# Patient Record
Sex: FEMALE | Race: WHITE
[De-identification: names, ages, dates, MRNs, and addresses within clinical notes are randomized per-mention and may not be internally consistent; named-entity substitution may affect disease eponyms.]

---

## 2018-05-10 ENCOUNTER — HOSPITAL ENCOUNTER (OUTPATIENT)
Dept: HOSPITAL 95 - LAB SHORT | Age: 83
Discharge: HOME | End: 2018-05-10
Attending: INTERNAL MEDICINE
Payer: MEDICARE

## 2018-05-10 DIAGNOSIS — N39.0: Primary | ICD-10-CM

## 2018-05-10 LAB
LEUKOCYTE ESTERASE UR QL STRIP: (no result)
RBC #/AREA URNS HPF: (no result) /HPF (ref 0–2)
SP GR SPEC: 1.01 (ref 1–1.02)
UROBILINOGEN UR STRIP-MCNC: (no result) MG/DL
WBC #/AREA URNS HPF: (no result) /HPF (ref 0–5)

## 2018-06-05 ENCOUNTER — HOSPITAL ENCOUNTER (EMERGENCY)
Dept: HOSPITAL 95 - ER | Age: 83
Discharge: HOME | End: 2018-06-05
Payer: MEDICARE

## 2018-06-05 VITALS — HEIGHT: 64 IN | BODY MASS INDEX: 34.15 KG/M2 | WEIGHT: 200 LBS

## 2018-06-05 DIAGNOSIS — Z88.8: ICD-10-CM

## 2018-06-05 DIAGNOSIS — Z88.5: ICD-10-CM

## 2018-06-05 DIAGNOSIS — Z79.4: ICD-10-CM

## 2018-06-05 DIAGNOSIS — Z79.899: ICD-10-CM

## 2018-06-05 DIAGNOSIS — S99.912A: Primary | ICD-10-CM

## 2018-06-05 DIAGNOSIS — I50.9: ICD-10-CM

## 2018-06-05 DIAGNOSIS — Z88.2: ICD-10-CM
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
## 2018-06-05 DIAGNOSIS — Z79.01: ICD-10-CM

## 2018-06-05 DIAGNOSIS — E03.9: ICD-10-CM

## 2018-06-05 DIAGNOSIS — Z88.6: ICD-10-CM

## 2018-06-05 DIAGNOSIS — I48.91: ICD-10-CM

## 2018-06-05 DIAGNOSIS — I11.0: ICD-10-CM

## 2018-07-02 ENCOUNTER — HOSPITAL ENCOUNTER (INPATIENT)
Dept: HOSPITAL 95 - ER | Age: 83
LOS: 4 days | Discharge: HOME | DRG: 918 | End: 2018-07-06
Attending: FAMILY MEDICINE | Admitting: FAMILY MEDICINE
Payer: MEDICARE

## 2018-07-02 VITALS — BODY MASS INDEX: 32.93 KG/M2 | HEIGHT: 64.02 IN | WEIGHT: 192.9 LBS

## 2018-07-02 DIAGNOSIS — M10.9: ICD-10-CM

## 2018-07-02 DIAGNOSIS — I48.0: ICD-10-CM

## 2018-07-02 DIAGNOSIS — I25.10: ICD-10-CM

## 2018-07-02 DIAGNOSIS — N18.3: ICD-10-CM

## 2018-07-02 DIAGNOSIS — E87.70: ICD-10-CM

## 2018-07-02 DIAGNOSIS — I50.9: ICD-10-CM

## 2018-07-02 DIAGNOSIS — Z95.0: ICD-10-CM

## 2018-07-02 DIAGNOSIS — E86.1: ICD-10-CM

## 2018-07-02 DIAGNOSIS — T42.6X1A: Primary | ICD-10-CM

## 2018-07-02 DIAGNOSIS — I25.2: ICD-10-CM

## 2018-07-02 DIAGNOSIS — E05.90: ICD-10-CM

## 2018-07-02 DIAGNOSIS — E11.22: ICD-10-CM

## 2018-07-02 DIAGNOSIS — E88.09: ICD-10-CM

## 2018-07-02 DIAGNOSIS — I13.0: ICD-10-CM

## 2018-07-02 DIAGNOSIS — N17.9: ICD-10-CM

## 2018-07-02 DIAGNOSIS — Z79.4: ICD-10-CM

## 2018-07-02 DIAGNOSIS — E03.9: ICD-10-CM

## 2018-07-02 DIAGNOSIS — E66.9: ICD-10-CM

## 2018-07-02 DIAGNOSIS — Z79.01: ICD-10-CM

## 2018-07-02 LAB
ALBUMIN SERPL BCP-MCNC: 3.2 G/DL (ref 3.4–5)
ALBUMIN/GLOB SERPL: 0.7 {RATIO} (ref 0.8–1.8)
ALT SERPL W P-5'-P-CCNC: 25 U/L (ref 12–78)
ANION GAP SERPL CALCULATED.4IONS-SCNC: 12 MMOL/L (ref 6–16)
AST SERPL W P-5'-P-CCNC: 18 U/L (ref 12–37)
BASOPHILS # BLD AUTO: 0.04 K/MM3 (ref 0–0.23)
BASOPHILS NFR BLD AUTO: 0 % (ref 0–2)
BILIRUB SERPL-MCNC: 0.3 MG/DL (ref 0.1–1)
BUN SERPL-MCNC: 82 MG/DL (ref 8–24)
CALCIUM SERPL-MCNC: 9 MG/DL (ref 8.5–10.1)
CHLORIDE SERPL-SCNC: 99 MMOL/L (ref 98–108)
CO2 SERPL-SCNC: 30 MMOL/L (ref 21–32)
CREAT SERPL-MCNC: 1.41 MG/DL (ref 0.4–1)
DEPRECATED RDW RBC AUTO: 41.9 FL (ref 35.1–46.3)
EOSINOPHIL # BLD AUTO: 0.13 K/MM3 (ref 0–0.68)
EOSINOPHIL NFR BLD AUTO: 1 % (ref 0–6)
ERYTHROCYTE [DISTWIDTH] IN BLOOD BY AUTOMATED COUNT: 12.3 % (ref 11.7–14.2)
GLOBULIN SER CALC-MCNC: 4.4 G/DL (ref 2.2–4)
GLUCOSE SERPL-MCNC: 219 MG/DL (ref 70–99)
HCT VFR BLD AUTO: 38.7 % (ref 33–51)
HGB BLD-MCNC: 13.1 G/DL (ref 11.5–16)
IMM GRANULOCYTES # BLD AUTO: 0.03 K/MM3 (ref 0–0.1)
IMM GRANULOCYTES NFR BLD AUTO: 0 % (ref 0–1)
LYMPHOCYTES # BLD AUTO: 0.99 K/MM3 (ref 0.84–5.2)
LYMPHOCYTES NFR BLD AUTO: 8 % (ref 21–46)
MAGNESIUM SERPL-MCNC: 2.5 MG/DL (ref 1.6–2.4)
MCHC RBC AUTO-ENTMCNC: 33.9 G/DL (ref 31.5–36.5)
MCV RBC AUTO: 93 FL (ref 80–100)
MONOCYTES # BLD AUTO: 1.05 K/MM3 (ref 0.16–1.47)
MONOCYTES NFR BLD AUTO: 9 % (ref 4–13)
NEUTROPHILS # BLD AUTO: 9.5 K/MM3 (ref 1.96–9.15)
NEUTROPHILS NFR BLD AUTO: 81 % (ref 41–73)
NRBC # BLD AUTO: 0 K/MM3 (ref 0–0.02)
NRBC BLD AUTO-RTO: 0 /100 WBC (ref 0–0.2)
PLATELET # BLD AUTO: 283 K/MM3 (ref 150–400)
POTASSIUM SERPL-SCNC: 3.8 MMOL/L (ref 3.5–5.5)
PROT SERPL-MCNC: 7.6 G/DL (ref 6.4–8.2)
PROTHROMBIN TIME: 108.5 SEC (ref 9.7–11.5)
SODIUM SERPL-SCNC: 141 MMOL/L (ref 136–145)
TROPONIN I SERPL-MCNC: <0.015 NG/ML (ref 0–0.04)
TSH SERPL DL<=0.005 MIU/L-ACNC: 0.25 UIU/ML (ref 0.36–4.8)
URATE SERPL-MCNC: 13.4 MG/DL (ref 2.6–6)

## 2018-07-02 PROCEDURE — G0378 HOSPITAL OBSERVATION PER HR: HCPCS

## 2018-07-03 LAB
ALBUMIN SERPL BCP-MCNC: 2.6 G/DL (ref 3.4–5)
ANION GAP SERPL CALCULATED.4IONS-SCNC: 8 MMOL/L (ref 6–16)
BASOPHILS # BLD AUTO: 0.03 K/MM3 (ref 0–0.23)
BASOPHILS NFR BLD AUTO: 0 % (ref 0–2)
BUN SERPL-MCNC: 74 MG/DL (ref 8–24)
CALCIUM SERPL-MCNC: 8.3 MG/DL (ref 8.5–10.1)
CHLORIDE SERPL-SCNC: 103 MMOL/L (ref 98–108)
CO2 SERPL-SCNC: 28 MMOL/L (ref 21–32)
CREAT SERPL-MCNC: 1.52 MG/DL (ref 0.4–1)
DEPRECATED RDW RBC AUTO: 42.6 FL (ref 35.1–46.3)
EOSINOPHIL # BLD AUTO: 0.18 K/MM3 (ref 0–0.68)
EOSINOPHIL NFR BLD AUTO: 2 % (ref 0–6)
ERYTHROCYTE [DISTWIDTH] IN BLOOD BY AUTOMATED COUNT: 12.4 % (ref 11.7–14.2)
GLUCOSE SERPL-MCNC: 225 MG/DL (ref 70–99)
HCT VFR BLD AUTO: 35.2 % (ref 33–51)
HGB BLD-MCNC: 11.8 G/DL (ref 11.5–16)
IMM GRANULOCYTES # BLD AUTO: 0.02 K/MM3 (ref 0–0.1)
IMM GRANULOCYTES NFR BLD AUTO: 0 % (ref 0–1)
LYMPHOCYTES # BLD AUTO: 1.45 K/MM3 (ref 0.84–5.2)
LYMPHOCYTES NFR BLD AUTO: 17 % (ref 21–46)
MAGNESIUM SERPL-MCNC: 2.5 MG/DL (ref 1.6–2.4)
MCHC RBC AUTO-ENTMCNC: 33.5 G/DL (ref 31.5–36.5)
MCV RBC AUTO: 93 FL (ref 80–100)
MONOCYTES # BLD AUTO: 1.16 K/MM3 (ref 0.16–1.47)
MONOCYTES NFR BLD AUTO: 13 % (ref 4–13)
NEUTROPHILS # BLD AUTO: 5.83 K/MM3 (ref 1.96–9.15)
NEUTROPHILS NFR BLD AUTO: 67 % (ref 41–73)
NRBC # BLD AUTO: 0 K/MM3 (ref 0–0.02)
NRBC BLD AUTO-RTO: 0 /100 WBC (ref 0–0.2)
PHOSPHATE SERPL-MCNC: 3.3 MG/DL (ref 2.5–4.9)
PLATELET # BLD AUTO: 264 K/MM3 (ref 150–400)
POTASSIUM SERPL-SCNC: 4.1 MMOL/L (ref 3.5–5.5)
PROTHROMBIN TIME: 90.9 SEC (ref 9.7–11.5)
SODIUM SERPL-SCNC: 139 MMOL/L (ref 136–145)

## 2018-07-04 LAB
ALBUMIN SERPL BCP-MCNC: 2.8 G/DL (ref 3.4–5)
ANION GAP SERPL CALCULATED.4IONS-SCNC: 12 MMOL/L (ref 6–16)
BASOPHILS # BLD AUTO: 0.01 K/MM3 (ref 0–0.23)
BASOPHILS NFR BLD AUTO: 0 % (ref 0–2)
BUN SERPL-MCNC: 73 MG/DL (ref 8–24)
CALCIUM SERPL-MCNC: 8.9 MG/DL (ref 8.5–10.1)
CHLORIDE SERPL-SCNC: 100 MMOL/L (ref 98–108)
CO2 SERPL-SCNC: 26 MMOL/L (ref 21–32)
CREAT SERPL-MCNC: 1.45 MG/DL (ref 0.4–1)
DEPRECATED RDW RBC AUTO: 40.9 FL (ref 35.1–46.3)
EOSINOPHIL # BLD AUTO: 0 K/MM3 (ref 0–0.68)
EOSINOPHIL NFR BLD AUTO: 0 % (ref 0–6)
ERYTHROCYTE [DISTWIDTH] IN BLOOD BY AUTOMATED COUNT: 12.1 % (ref 11.7–14.2)
GLUCOSE SERPL-MCNC: 311 MG/DL (ref 70–99)
HCT VFR BLD AUTO: 34.8 % (ref 33–51)
HGB BLD-MCNC: 11.9 G/DL (ref 11.5–16)
IMM GRANULOCYTES # BLD AUTO: 0.06 K/MM3 (ref 0–0.1)
IMM GRANULOCYTES NFR BLD AUTO: 1 % (ref 0–1)
LYMPHOCYTES # BLD AUTO: 0.72 K/MM3 (ref 0.84–5.2)
LYMPHOCYTES NFR BLD AUTO: 6 % (ref 21–46)
MAGNESIUM SERPL-MCNC: 2.4 MG/DL (ref 1.6–2.4)
MCHC RBC AUTO-ENTMCNC: 34.2 G/DL (ref 31.5–36.5)
MCV RBC AUTO: 92 FL (ref 80–100)
MONOCYTES # BLD AUTO: 0.96 K/MM3 (ref 0.16–1.47)
MONOCYTES NFR BLD AUTO: 9 % (ref 4–13)
NEUTROPHILS # BLD AUTO: 9.57 K/MM3 (ref 1.96–9.15)
NEUTROPHILS NFR BLD AUTO: 85 % (ref 41–73)
NRBC # BLD AUTO: 0 K/MM3 (ref 0–0.02)
NRBC BLD AUTO-RTO: 0 /100 WBC (ref 0–0.2)
PHOSPHATE SERPL-MCNC: 2.4 MG/DL (ref 2.5–4.9)
PLATELET # BLD AUTO: 280 K/MM3 (ref 150–400)
POTASSIUM SERPL-SCNC: 4 MMOL/L (ref 3.5–5.5)
PROTHROMBIN TIME: 55.1 SEC (ref 9.7–11.5)
SODIUM SERPL-SCNC: 138 MMOL/L (ref 136–145)

## 2018-07-05 LAB
ALBUMIN SERPL BCP-MCNC: 2.5 G/DL (ref 3.4–5)
ANION GAP SERPL CALCULATED.4IONS-SCNC: 9 MMOL/L (ref 6–16)
BUN SERPL-MCNC: 78 MG/DL (ref 8–24)
CALCIUM SERPL-MCNC: 8.9 MG/DL (ref 8.5–10.1)
CHLORIDE SERPL-SCNC: 102 MMOL/L (ref 98–108)
CO2 SERPL-SCNC: 28 MMOL/L (ref 21–32)
CREAT SERPL-MCNC: 1.56 MG/DL (ref 0.4–1)
GLUCOSE SERPL-MCNC: 227 MG/DL (ref 70–99)
HCT VFR BLD AUTO: 32.9 % (ref 33–51)
HGB BLD-MCNC: 11.2 G/DL (ref 11.5–16)
MAGNESIUM SERPL-MCNC: 2.2 MG/DL (ref 1.6–2.4)
PHOSPHATE SERPL-MCNC: 2.6 MG/DL (ref 2.5–4.9)
POTASSIUM SERPL-SCNC: 4.1 MMOL/L (ref 3.5–5.5)
PROTHROMBIN TIME: 46.2 SEC (ref 9.7–11.5)
SODIUM SERPL-SCNC: 139 MMOL/L (ref 136–145)

## 2018-07-06 LAB
ALBUMIN SERPL BCP-MCNC: 2.7 G/DL (ref 3.4–5)
ANION GAP SERPL CALCULATED.4IONS-SCNC: 11 MMOL/L (ref 6–16)
BUN SERPL-MCNC: 81 MG/DL (ref 8–24)
CALCIUM SERPL-MCNC: 8.5 MG/DL (ref 8.5–10.1)
CHLORIDE SERPL-SCNC: 102 MMOL/L (ref 98–108)
CO2 SERPL-SCNC: 27 MMOL/L (ref 21–32)
CREAT SERPL-MCNC: 1.28 MG/DL (ref 0.4–1)
GLUCOSE SERPL-MCNC: 146 MG/DL (ref 70–99)
HCT VFR BLD AUTO: 32 % (ref 33–51)
HGB BLD-MCNC: 10.7 G/DL (ref 11.5–16)
MAGNESIUM SERPL-MCNC: 2.1 MG/DL (ref 1.6–2.4)
PHOSPHATE SERPL-MCNC: 2.4 MG/DL (ref 2.5–4.9)
POTASSIUM SERPL-SCNC: 4.2 MMOL/L (ref 3.5–5.5)
PROTHROMBIN TIME: 14.7 SEC (ref 9.7–11.5)
SODIUM SERPL-SCNC: 140 MMOL/L (ref 136–145)
URATE SERPL-MCNC: 8.4 MG/DL (ref 2.6–6)

## 2018-08-06 ENCOUNTER — HOSPITAL ENCOUNTER (EMERGENCY)
Dept: HOSPITAL 95 - ER | Age: 83
Discharge: HOME | End: 2018-08-06
Payer: MEDICARE

## 2018-08-06 VITALS — HEIGHT: 64 IN | BODY MASS INDEX: 33.97 KG/M2 | WEIGHT: 198.99 LBS

## 2018-08-06 DIAGNOSIS — R79.1: Primary | ICD-10-CM

## 2018-08-06 DIAGNOSIS — Z79.4: ICD-10-CM

## 2018-08-06 DIAGNOSIS — I48.0: ICD-10-CM

## 2018-08-06 DIAGNOSIS — E03.9: ICD-10-CM

## 2018-08-06 DIAGNOSIS — Z79.899: ICD-10-CM

## 2018-08-06 DIAGNOSIS — Z79.01: ICD-10-CM

## 2018-08-06 DIAGNOSIS — I50.9: ICD-10-CM

## 2018-08-06 DIAGNOSIS — I11.0: ICD-10-CM

## 2018-08-06 DIAGNOSIS — I48.2: ICD-10-CM

## 2018-08-06 DIAGNOSIS — E11.9: ICD-10-CM

## 2018-08-06 LAB
ALBUMIN SERPL BCP-MCNC: 3.3 G/DL (ref 3.4–5)
ALBUMIN/GLOB SERPL: 0.9 {RATIO} (ref 0.8–1.8)
ALT SERPL W P-5'-P-CCNC: 110 U/L (ref 12–78)
ANION GAP SERPL CALCULATED.4IONS-SCNC: 9 MMOL/L (ref 6–16)
AST SERPL W P-5'-P-CCNC: 79 U/L (ref 12–37)
BASOPHILS # BLD AUTO: 0.04 K/MM3 (ref 0–0.23)
BASOPHILS NFR BLD AUTO: 1 % (ref 0–2)
BILIRUB SERPL-MCNC: 0.4 MG/DL (ref 0.1–1)
BUN SERPL-MCNC: 47 MG/DL (ref 8–24)
CALCIUM SERPL-MCNC: 9 MG/DL (ref 8.5–10.1)
CHLORIDE SERPL-SCNC: 104 MMOL/L (ref 98–108)
CO2 SERPL-SCNC: 26 MMOL/L (ref 21–32)
CREAT SERPL-MCNC: 1.32 MG/DL (ref 0.4–1)
DEPRECATED RDW RBC AUTO: 42.6 FL (ref 35.1–46.3)
EOSINOPHIL # BLD AUTO: 0.18 K/MM3 (ref 0–0.68)
EOSINOPHIL NFR BLD AUTO: 3 % (ref 0–6)
ERYTHROCYTE [DISTWIDTH] IN BLOOD BY AUTOMATED COUNT: 12.6 % (ref 11.7–14.2)
GLOBULIN SER CALC-MCNC: 3.5 G/DL (ref 2.2–4)
GLUCOSE SERPL-MCNC: 204 MG/DL (ref 70–99)
HCT VFR BLD AUTO: 37 % (ref 33–51)
HGB BLD-MCNC: 12.6 G/DL (ref 11.5–16)
IMM GRANULOCYTES # BLD AUTO: 0.02 K/MM3 (ref 0–0.1)
IMM GRANULOCYTES NFR BLD AUTO: 0 % (ref 0–1)
LYMPHOCYTES # BLD AUTO: 1.43 K/MM3 (ref 0.84–5.2)
LYMPHOCYTES NFR BLD AUTO: 26 % (ref 21–46)
MCHC RBC AUTO-ENTMCNC: 34.1 G/DL (ref 31.5–36.5)
MCV RBC AUTO: 92 FL (ref 80–100)
MONOCYTES # BLD AUTO: 0.71 K/MM3 (ref 0.16–1.47)
MONOCYTES NFR BLD AUTO: 13 % (ref 4–13)
NEUTROPHILS # BLD AUTO: 3.05 K/MM3 (ref 1.96–9.15)
NEUTROPHILS NFR BLD AUTO: 56 % (ref 41–73)
NRBC # BLD AUTO: 0 K/MM3 (ref 0–0.02)
NRBC BLD AUTO-RTO: 0 /100 WBC (ref 0–0.2)
PLATELET # BLD AUTO: 296 K/MM3 (ref 150–400)
POTASSIUM SERPL-SCNC: 3.8 MMOL/L (ref 3.5–5.5)
PROT SERPL-MCNC: 6.8 G/DL (ref 6.4–8.2)
PROTHROMBIN TIME: 66.2 SEC (ref 9.7–11.5)
SODIUM SERPL-SCNC: 139 MMOL/L (ref 136–145)

## 2018-08-15 ENCOUNTER — HOSPITAL ENCOUNTER (EMERGENCY)
Dept: HOSPITAL 95 - ER | Age: 83
Discharge: HOME | End: 2018-08-15
Payer: MEDICARE

## 2018-08-15 VITALS — BODY MASS INDEX: 38.98 KG/M2 | WEIGHT: 220 LBS | HEIGHT: 63 IN

## 2018-08-15 DIAGNOSIS — Z79.899: ICD-10-CM

## 2018-08-15 DIAGNOSIS — Z91.09: ICD-10-CM

## 2018-08-15 DIAGNOSIS — Z79.01: ICD-10-CM

## 2018-08-15 DIAGNOSIS — Z88.2: ICD-10-CM

## 2018-08-15 DIAGNOSIS — Z88.8: ICD-10-CM

## 2018-08-15 DIAGNOSIS — Z88.5: ICD-10-CM

## 2018-08-15 DIAGNOSIS — I50.9: ICD-10-CM

## 2018-08-15 DIAGNOSIS — I11.0: ICD-10-CM

## 2018-08-15 DIAGNOSIS — Z79.4: ICD-10-CM

## 2018-08-15 DIAGNOSIS — M10.9: Primary | ICD-10-CM

## 2018-08-15 DIAGNOSIS — I48.91: ICD-10-CM

## 2018-12-20 ENCOUNTER — HOSPITAL ENCOUNTER (OUTPATIENT)
Dept: HOSPITAL 95 - LAB SHORT | Age: 83
Discharge: HOME | End: 2018-12-20
Attending: NURSE PRACTITIONER
Payer: MEDICARE

## 2018-12-20 DIAGNOSIS — N39.0: Primary | ICD-10-CM

## 2018-12-26 ENCOUNTER — HOSPITAL ENCOUNTER (INPATIENT)
Dept: HOSPITAL 95 - ER | Age: 83
LOS: 8 days | Discharge: HOSPICE | DRG: 55 | End: 2019-01-03
Attending: HOSPITALIST | Admitting: HOSPITALIST
Payer: MEDICARE

## 2018-12-26 VITALS — BODY MASS INDEX: 33.98 KG/M2 | WEIGHT: 191.8 LBS | HEIGHT: 62.99 IN

## 2018-12-26 DIAGNOSIS — G30.9: ICD-10-CM

## 2018-12-26 DIAGNOSIS — Z95.0: ICD-10-CM

## 2018-12-26 DIAGNOSIS — Z51.5: ICD-10-CM

## 2018-12-26 DIAGNOSIS — F22: ICD-10-CM

## 2018-12-26 DIAGNOSIS — I48.0: ICD-10-CM

## 2018-12-26 DIAGNOSIS — E11.9: ICD-10-CM

## 2018-12-26 DIAGNOSIS — C71.2: Primary | ICD-10-CM

## 2018-12-26 DIAGNOSIS — E87.6: ICD-10-CM

## 2018-12-26 DIAGNOSIS — E03.9: ICD-10-CM

## 2018-12-26 DIAGNOSIS — F02.81: ICD-10-CM

## 2018-12-26 DIAGNOSIS — I25.10: ICD-10-CM

## 2018-12-26 DIAGNOSIS — I25.2: ICD-10-CM

## 2018-12-26 LAB
ALBUMIN SERPL BCP-MCNC: 3.3 G/DL (ref 3.4–5)
ALBUMIN/GLOB SERPL: 0.7 {RATIO} (ref 0.8–1.8)
ALT SERPL W P-5'-P-CCNC: 30 U/L (ref 12–78)
ANION GAP SERPL CALCULATED.4IONS-SCNC: 8 MMOL/L (ref 6–16)
AST SERPL W P-5'-P-CCNC: 29 U/L (ref 12–37)
BASOPHILS # BLD AUTO: 0.04 K/MM3 (ref 0–0.23)
BASOPHILS NFR BLD AUTO: 0 % (ref 0–2)
BILIRUB SERPL-MCNC: 0.4 MG/DL (ref 0.1–1)
BUN SERPL-MCNC: 18 MG/DL (ref 8–24)
CALCIUM SERPL-MCNC: 9 MG/DL (ref 8.5–10.1)
CHLORIDE SERPL-SCNC: 103 MMOL/L (ref 98–108)
CO2 SERPL-SCNC: 32 MMOL/L (ref 21–32)
CREAT SERPL-MCNC: 0.93 MG/DL (ref 0.4–1)
DEPRECATED RDW RBC AUTO: 49 FL (ref 35.1–46.3)
EOSINOPHIL # BLD AUTO: 0.13 K/MM3 (ref 0–0.68)
EOSINOPHIL NFR BLD AUTO: 1 % (ref 0–6)
ERYTHROCYTE [DISTWIDTH] IN BLOOD BY AUTOMATED COUNT: 15 % (ref 11.7–14.2)
GLOBULIN SER CALC-MCNC: 4.6 G/DL (ref 2.2–4)
GLUCOSE SERPL-MCNC: 117 MG/DL (ref 70–99)
HCT VFR BLD AUTO: 44.9 % (ref 33–51)
HGB BLD-MCNC: 14.7 G/DL (ref 11.5–16)
IMM GRANULOCYTES # BLD AUTO: 0.04 K/MM3 (ref 0–0.1)
IMM GRANULOCYTES NFR BLD AUTO: 0 % (ref 0–1)
LEUKOCYTE ESTERASE UR QL STRIP: (no result)
LYMPHOCYTES # BLD AUTO: 1.29 K/MM3 (ref 0.84–5.2)
LYMPHOCYTES NFR BLD AUTO: 13 % (ref 21–46)
MCHC RBC AUTO-ENTMCNC: 32.7 G/DL (ref 31.5–36.5)
MCV RBC AUTO: 90 FL (ref 80–100)
MONOCYTES # BLD AUTO: 0.81 K/MM3 (ref 0.16–1.47)
MONOCYTES NFR BLD AUTO: 8 % (ref 4–13)
NEUTROPHILS # BLD AUTO: 7.68 K/MM3 (ref 1.96–9.15)
NEUTROPHILS NFR BLD AUTO: 77 % (ref 41–73)
NRBC # BLD AUTO: 0 K/MM3 (ref 0–0.02)
NRBC BLD AUTO-RTO: 0 /100 WBC (ref 0–0.2)
PLATELET # BLD AUTO: 306 K/MM3 (ref 150–400)
POTASSIUM SERPL-SCNC: 2.9 MMOL/L (ref 3.5–5.5)
PROT SERPL-MCNC: 7.9 G/DL (ref 6.4–8.2)
PROTHROMBIN TIME: 12.4 SEC (ref 9.7–11.5)
SODIUM SERPL-SCNC: 143 MMOL/L (ref 136–145)
SP GR SPEC: 1.01 (ref 1–1.02)
UROBILINOGEN UR STRIP-MCNC: (no result) MG/DL
WBC #/AREA URNS HPF: (no result) /HPF (ref 0–5)

## 2018-12-27 LAB
ANION GAP SERPL CALCULATED.4IONS-SCNC: 9 MMOL/L (ref 6–16)
BUN SERPL-MCNC: 21 MG/DL (ref 8–24)
CALCIUM SERPL-MCNC: 9.2 MG/DL (ref 8.5–10.1)
CHLORIDE SERPL-SCNC: 101 MMOL/L (ref 98–108)
CO2 SERPL-SCNC: 27 MMOL/L (ref 21–32)
CREAT SERPL-MCNC: 1.09 MG/DL (ref 0.4–1)
GLUCOSE SERPL-MCNC: 377 MG/DL (ref 70–99)
POTASSIUM SERPL-SCNC: 4.3 MMOL/L (ref 3.5–5.5)
SODIUM SERPL-SCNC: 137 MMOL/L (ref 136–145)

## 2018-12-27 NOTE — NUR
Dr. Carlin has asked Palliative to review pt's medical history to determine
if pt has advance directive or something else to indicate patient wishes for
treatment. Pt has newly found brain mass. I did talk with the daughters about
this and all they said was, "she would want to know what it is. We have to
find out what it is first before we know what she would do about it."
Recommend further testing. Apparently, an MRI was ordered for the pt, but she
couldn't have this done because of her implanted pacemaker. Recommend oncology
consult. Pt does not have adv dir or POLST. Will remain available.

## 2018-12-27 NOTE — NUR
SUMMARY
PT CONTINUES ON 2 MD HOLD. HER DAUGHTERS HERE TODAY TO MEET WITH [employer] & DR SUH. INFO PROVIDED FOR THEM TO BEGIN GUARDIANSHIP PROCESS. PT CONTINUES
CONFUSED. SHE IS ANGRY @ X'S DEMANDING TO LEAVE, @ OTHER X'S
TEARFUL/EMOTIONAL. REQUIRES REASSURANCES/COMFORT. SHE HAS DECLINED PRN ATIVAN
FOR ANXIETY HOWEVER STATE WILL TAKE TONITE R/T POOR SLEEP. 1:1 SITTER
CONTINUES. CT HEAD YESTERDAY REVEAL MASS, DR WANTED MRI HOWEVER UNABLE D/T
PACEMAKER. CT WITH CONTRAST UNAVAIL FOR NOW D/T ALLERGY.  ORDER PSYCHE
CONSULT, ER MOTIFIED & SENT FACE SHEET. BLOOD SUGRS HIGH, DR ADJUST
INSULIN. VSS.
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
## 2018-12-27 NOTE — NUR
SHIFT SUMMARY
PT PLEASANT AND COOPERATIVE THROUGH MOST OF THE SHIFT. POOR SHORT TERM MEMORY.
FORGETS NEW INFORMATION ONLY MINUTES AFTER BEING TOLD. PT DOES NOT UNDERSTAND
WHY SHE IS HERE, ATTEMPTED TO EDUCATE PT THROUGHOUT THE NIGHT BUT PT QUICKLY
FORGOT. PT UPSET ABOUT BEING HERE, SHE WANTS TO GO HOME. SHE BELIEVES THAT HER
DAUGHTER TOLD HER THAT DAUGHTER WOULD BE TAKING PT HOME TODAY. PT DID NOT
SLEEP WELL THIS EVENING ONLY SLEEPING FOR AN HOUR EARLIER IN THE NIGHT. PT DID
NOT WANT THE DOOR SHUT AND OTHER PT'S WERE LOUD THIS EVENING, YELLING THROUGH
THE ENTIRE NIGHT. INFORMED OF PT'S DINNER TRAY IN THE PANTRY LATER IN THE
EVENING. PT REFUSED TO EAT IT. PT DRINKING WELL AT START OF SHIFT. REQUESTED
FOOD AT APPROX 0315 STATING THAT SHE WAS VERY HUNGRY. PT AT 1/2 TURKERY
SANDWICH AND 2 SUGAR FREE VANILLA PUDDINGS. PT LAYING IN BED AT THIS TIME.
DENIES ANY NEEDS. VSS. WILL CONTINUE TO MONITOR AND REPORT TO DAY RN AND
SITTER.

## 2018-12-28 NOTE — NUR
PT ESCALATING ANX. MEDICATED FOR ANX. DAUGHTER TRYING TO CALM HER. NOT
HELPING. PTCRYING, WANTS TO GO HOME.

## 2018-12-28 NOTE — NUR
PT MORE RELAXED AT THIS TIME. SHE IS NOT YELLING AND SWEARING AND THREATENING
AT THIS TIME. DAUGHTER ASSISTED HER TO BED AND IS MORE CALM WITH THE ATIVAN.

## 2018-12-28 NOTE — NUR
PT PLEASANT COOP CONFUSED. UNABLE TO TELL DATE, KNOWS SELF FAMILY. WANTS TO GO
HOME. H/R REG, NO MURMER NOTED.NO TELE. LUNGS CLEAR, RESP EASY, UNLABORED. ON
R/A. BT X4 LAST BM UNKNOWN BY PT. VOIDS BATHROOM. BED IN LOW POSITION, CALL
LITE IN REACH, BED ALRARM ON FOR SAFETY. SITTER IN ROOM. PT ON DR HOLD THRU
THURSDAY. NO OTHER CONCERNS AT THIS TIME.

## 2018-12-28 NOTE — NUR
SHIFT SUMMARY
PT MOSTLY A/O BUT WITH CONFUSION AND DIFFICULTIES W/ SHORT TERM MEMORY. PT
REMAINS UPSET THAT SHE IS IN THE HOSPITAL AND REPORTS THAT SHE WOULD LIKE TO
GO HOME. 1 : 1 SITTER AT THE BEDSIDE THROUGHOUT THE NIGHT. NO COMPLAINTS FROM
PT THIS EVENING. DENIES PAIN. SOME ANXIETY AT TIMES. ATIVAN 0.5 MG IV GIVEN X
1 EARLY IN SHIFT. PT SLEPT THROUGH MOST OF THE NIGHT FOLLOWING. SLEPT WELL BUT
AWAKES EASILY. CONTINENT. NO ACUTE CHANGES. VSS. PT RESTING COMFORTABLY IN BED
AT THIS TIME.

## 2018-12-28 NOTE — NUR
PT WOKE THIS AM VERY UPSET. CRYING. STATING THAT SHE WILL BE GOING HOME TODAY
EVEN IF SHE HAS TO WALK. PT STATING THAT SHE WAS TOLD THAT SHE WILL HAVE TO
LIVE HERE NOW. EXPLAINED TO THE PT THAT NOTHING HAS BEEN DECIDED THAT WE ARE
JUST TRYING TO KEEP HER SAFE FOR NOW.

## 2018-12-28 NOTE — NUR
PT HAS HAD AN EMOTIONALLY DIFFICULT DAY. DENIES PAIN. HAS STATED WILL BE
LEAVING TODAY. WANTS TO GO NOW. DEMANDS TO LEAVE. HAVE REINFORCED MAY NOT GO
UNTIL IS SAFE AT HOME. THIS IS WHAT WE ARE WORKING TOWARDS. MAY TAKE A FEW
DAYS. SHE WAS YELLING, CURSING, THREATENING TO LEAVE. HATES FAMILY, US ETC.
ATIVAN WAS GIVEN. PT CALMER NOW. RESTING IN BED. STILL UPSET. BED IN LOW
POSITOIN, CALL LITE IN REACH, SITTER AT BEDSIDE.
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
## 2018-12-29 NOTE — NUR
SITTER IN PATIENT ROOM. NO SIGNS OF SUICIDAL IDEATION. DAUGHTER IN ROOM EARLY
THIS AFTERNOON. PATIENT REFUSED MORNING MEDICATIONS BUT TOOK THEM WHEN HER
DAUGHTER WAS IN THE ROOM. PATIENT QUESTIONS ALL MEDICATIONS GIVEN, AFTER RN
EXPLAINS WHAT EACH MEDICATION IS FOR PATIENT STATES "I DO FINE WITHOUT THIS
MEDICATION AT HOME" RN EXPLAINED TO PATIENT THAT DURING HOSPITAL STAYS DRS
WILL CHANGE MEDICATIONS TO FIT THE PATIENT'S NEED AT THE CURRENT TIME. IV
ACCESS WORKING WELL. ATIVAN GIVEN FOR AGITATION. SITTER IN PLACE, CALL LIGHT
IN REACH. CONSULT FAXED TO DR. COOL

## 2018-12-29 NOTE — NUR
SHIFT SUMMARY
PATIENT HAD NO ACUTE CHANGES OBSERVED DURING THE SHIFT. AXO X3 W/CONFUSION.
INDEPENDENT. TAKES MEDS WHOLE WITH WATER. DENIES PAIN, SOB, AND N/V. PIV
REMAINS INTACT. . REPEATS WANTING TO GO HOME AND REPEATS SAME
QUESTIONS WHEN ANSWERS HAVE BEEN PROVIDED. VSS/AFEBRILE. SITTER 1:1. CALL
LIGHT IN REACH. BED IN LOWEST POSITION. WILL CONTINUE TO MONITOR UNTIL DAY
SHIFT NURSE ASSUMES CARE.

## 2018-12-29 NOTE — NUR
PATIENT SITTING UP IN BED. COOPERATIVE WITH CARE. REPEATS ASKING WHAT
MEDICATION IS FOR. SITTER 1:1. REPORTS WANTING TO NOW GO TO SLEEP. WILL
CONTINUE TO MONITOR.

## 2018-12-29 NOTE — NUR
BREAKFAST
AFTER BREAKFAST TRAY WAS TAKEN OUT, PATIENT ENDED UP STATING SHE WAS NOW
HUNGRY AND WOULD LIKE SOME BREAKFAST.

## 2018-12-30 NOTE — NUR
PATIENT ANXIOUS AND WANTING TO REFUSE INSULIN HUMALOG AND LANTUS. PATIENT DID
ALLOW INSULIN AFTER A FEW MINUTES. MOVE FROM BED TO CHAIR. SITTER 1:1. DENIES
PAIN, SOB, AND N/V. CALL LIGHT IN REACH.

## 2018-12-30 NOTE — NUR
/93 AT SHIFT CHANGE. CARDIZEM 60 MG GIVEN PER EMAR BEDTIME.
/85 AT REASSESSMENT. PATIENT SLEEPING. SITTER 1:1. CALL LIGHT IN REACH.

## 2018-12-30 NOTE — NUR
PATIENT ANXIOUS AND SOMEWHAT AGITATED TODAY. GIVEN PRN ATIVAN FOR THE
AGITATION. PATIENT DENIES HAVING A BRAIN TUMOR AND STATES SHE DOES NOT KNOW
WHY SHE IS IN THE HOSPTIAL. SHE STATES HER DR (PCP) SENT HER HERE AND THAT SHE
(THE PATIENT) IS VERY ANGRY ABOUT IT. SHE TENDS TO ASK THE SAME QUESTIONS
SEVERAL TIMES THROUGH OUT THE DAY AND DOES NOT REMEMBER ASKING BEFORE NOR DOES
SHE REMEMBER PREVIOUS ANSWERS.
 
SHE HAD A TELE PSYCH APPOINTMENT AT 2:15 TODAY. TELE PSYCH DR PLACED PATIENT
ON SCHEDULED AND PRN SEROQUIL FOR AGITATION. CURRENTLY ON IV STERIODS TO EASE
FURTHER SWELLING INSIDE THE PATIENT'S BRAIN. PATIENT HAS BEEN REQUIRING
INSULIN COVERAGE. HER CBG SEEMS TO STAY IN -300 RANGE. SHE IS CURRENTLY
STILL ON A 2 MD HOLD AND HAS A SITTER IN THE ROOM. SHE IS ABLE TO AMBULATE
INDEPENDENTLY AND NEEDS VERY LITTLE SET UP ASSISTANCE FOR MEALS.

## 2018-12-31 NOTE — NUR
PATIENT DROWSY AND CONFUSED UP TO BATHROOM. NEEDING ONE ASSIST WITH FWW WHEN
LAST NIGHT WAS MORE ALERT AND INDEPENDENT. SITTER 1:1. CALL LIGHT IN REACH.

## 2018-12-31 NOTE — NUR
PATIENT IS FIXED ON WANTING TO GO HOME. SHE IS REFUSING
SEROQUEL 12.5 MG PRN AT THIS TIME. STARTING TO YELL AT SITTER SHE WANTS TO GO
HOME. WILL TRY MEDICATION AGAIN. PATIENT IN CHAIR. CALL LIGHT IN REACH.

## 2018-12-31 NOTE — NUR
SHIFT SUMMARY
PATIENT ESCULATING IN AGITATION. SEE NOTES. REFUSING MEDICATIONS FIRST TIME
ASKED. SLEPT AND AWOKE DROWSY AND CONFUSED UP TO BATHROOM. PATIENT CONTINUE TO
RAISE VOICE REPORTING I WANT TO GO HOME. REFUSED SEROQUEL 12.5 MG PRN. BP
ELEVATED 178/93 AND REDUCED /85 AFTER BEDTIME CARDIZEM 60 MG. SHORT TERM
MEMORY LOSS. . DENIES PAIN, SOB, AND N/V. PATIENT TRIED TO WALK PAST
SITTER TO LEAVE ROOM. CALL LIGHT IN REACH. BED IN LOWEST POSITION. WILL
CONTINUE TO MONITOR UNTIL DAY SHIFT NURSE ASSUMES CARE.

## 2018-12-31 NOTE — NUR
PATIENT ESCULATING IN AGITATION SHOUTING I WHAT TO GO HOME. TRIED TO GET UP
AND WALK PAST SITTER. PATIENT WILL NOT REORIENT AND NOT GET BACK INTO BED.
CONTINUES TO SHOUT. CHARGE NURSE PRESENT RELIEVING SITTER FOR BREAK. WILL
CONTINUE TO MONITOR.

## 2018-12-31 NOTE — NUR
SHE IS NAPPING NOW AFTER EATING MOST OF HER LUNCH. FIRST THING THIS MORNING
SHE WAS AGITATED, CONFUSED, ARGUMENTATIVE. SHE ONLY WANTED TO GO HOME AND HAS
NO SHORT TERM MEMORY TO REMEMBER OUR EXPLANATION THAT SHE CANNOT BE DISCHARGED
TODAY. SHE REMAINS ON A 2 MD HOLD AND HAS A SITTER IN THE ROOM. HER DAUGHTERS
CAME AT BREAKFAST TIME AND WERE ABLE TO TALK HER INTO EATING AND TAKING HER
MORNING MEDICATIONS. I INCLUDED A PRN SEROQUEL WITH HER SCHEDULED MEDICATIONS.
IT SEEMED VERY EFFECTIVE. SHE FIRT TOOK A NAP THEN WAS MORE CALM, LESS
STRESSED AND MORE COOPERATIVE. HER DAUGHTERS DID NOT LIKE THE FACT THAT ATIVAN
WAS REMOVED AND SEROQUEL STARTED. THEY REQUESTED ATIVAN BACK AND REQUESTED A
CXR. I SPOKE WITH . CXR ORDERED AND DONE. PHONE NUMBER GIVEN TO
DAUGHTER FOR THE PSYCHIATRIST.  WANTS TO FOLLOW HIS RECCOMENDATIONS.
CXR NEGATIVE. PALLIATIVE CARE NURSE ALSO SPOKE WITH MANNY'S DAUGHTERS AND THEN
. I AM EXPECTING COMFORT CARE ORDERS THIS AFTERNOON.

## 2019-01-01 NOTE — NUR
SHE IS SITTING UP IN A CHAIR ASKING WHY SHE CAN'T GO HOME YET. SHE IS
COOPERATIVE THOUGH AND SPEAKING QUIETLY. SITTER PRESENT.
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
## 2019-01-01 NOTE — NUR
NO CHANGE IN CONDITION. SITTER PRESENT. ATIVAN JUST GIVEN BECAUSE SHE IS
VERBALIZING MORE DISTRESS ABOUT BEING HERE AND WANTING TO GO HOME. SHE SAYS
SHE DOESN'T WANT TO EAT. SHE WAS AFRAID SOMEONE PUT MEDICINE OF SOME KIND IN
HER FOOD. INSTRUCTED AND REASSURED.

## 2019-01-01 NOTE — NUR
NIGHT SHIFT SUMMARY
NO ACUTE CHANGES THIS SHIFT. PT AAO TO SELF MAINLY. FOLLOWS DIRECTIONS AT
TIMES BUT IS EASILY AGITATED AND CAN RESIST CARE ON OCCASION. PT REMAINS ON 2
MD HOLD WITH 1:1 SITTER AT ALL TIMES. PT UP IN ROOM INDEPENDENTLY WITH
SUPERVISION FROM SITTER. PT DENIES SOB, N/V, PAIN. AT START OF SHIFT PT
REPORTS NEEDING "SOME TYPE OF MEDICINE TO HELP ME HAVE A BOWEL MOVEMENT". PT
HAD NO BOWEL CARE MEDS ON EMAR. SPOKE TO DR JAVED WHO GAVE ORDER TO START
BOWEL CARE PROTOCOL. PT IS EAGER TO GO HOME WITH HER DAUGHTER AND WHILE SHE IS
AWAKE SHE CONTINUALLY REPEATS THAT SHE WANTS TO GO HOME. SHORT TERM MEMORY IS
VERY POOR AND PT MUST BE REORIENTED FREQUENTLY. VSS, WILL CONTINUE TO MONITOR.
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
## 2019-01-02 NOTE — NUR
AM ASSESSMENT-
PT SITTING UP IN CHAIR EATING BREAKFAST. PT AWAKE AND ALERT TO PERSON AND
PLACE. PT DENIES ANY COMPLAINTS. LS CLEAR, ON RA. HRR. NO EDEMA PRESENT. NO IV
ACCESS NOTED. PT UP TO CHAIR WITH SBA. PT CONT TO STATE THAT SHE JUST WANTS TO
GO HOME, PT REPORTS SHE WAS NOT GOING TO TAKE MEDICATIONS AND DOESNT TRUST
DOCTORS BUT DID END UP TAKING THE MEDICATIONS. SITTER PRESENT. WILL CONT TO
MONITOR.

## 2019-01-02 NOTE — NUR
SHIFT SUMMARY
PT SLEPT FAIR, AT FIRST AFRAID TO SLEEP BECAUSE SHE THOUGHT SHE WOULDN'T WAKE
UP. UP IN CHAIR THIS AM, NO C/O'S. SITTER IN ROOM CONVERSING WITH PT. NO ACUTE
CHANGES NOTED, WILL CONTINUE TO MONITOR.

## 2019-01-02 NOTE — NUR
Pt visit this AM. Family and  present during visit. Pt denies
pain and dyspnea at this time. Pt reports mild anxiety and states her anxiety
is due to wanting to go home. She appears confused and is in denial about
having cancer. Engaged in therapeutic listening including Pt and family's
perception of disease process. Pt's family would like Pt to be discharged
tomorrow so they can prepare ths home today for her arrival. Social work is
making arrangements for hospital bed be delivered today. No other concerns
reported at this time.
 
Spoke with Pt's nurse Harry and she reports her plan to request from Dr aguayo for anxiety and agitation per family request. No other concerns
reported at this time.
 
Plan to remain available

## 2019-01-02 NOTE — NUR
COMFORT: PATIENT IS SITTIMNG IN CHAIR, DOES NOT WANT TO GET INTO BED AND IS
REFUSING ALL ORAL MEDICATION. PATIENT IS RE APPROACHED FOR SCHEDULED HS MEDS
AND AGGREES TO TAKING CARDIZEM BUT CONTINUES TO REFUSE ALL OTHERS EXCEPT
INSULIN. BLOOD GLUCOSE . PATIENT DENIES PAIN.

## 2019-01-03 NOTE — NUR
PATIENT FAMILY ARRIVED TO PICK PATIENT UP. ELAYNE NICOLE IN ROOM TO DISCUSS
DISCHARGE PLANS WITH PATIENT (SEE NOTES FROM ELAYNE NICOLE).
ELAYNE LEFT TO FINISH UP PAPER WORK. AFTER 1 HOUR THIS RN ATTEMPTED TO CALL ELAYNE
D/T
FAMILY REQUESTING TO
LEAVE FACILITY. THIS RN STATED ALL THE DISCHARGE PAPER WORK WAS
COMPLETE ON OUR PART. THE FAMILY VERBALIZED
CONCERNS OF RECIEVING MEDICATIONS. INFORMED FAMILY
THAT MEDS HAVE BEEN FAXED TO [facility] AND [facility] IN [place].
ELAYNE
HAD A
HARD SCRIPT FOR PATIENT FOR ATIVAN.
, NOT PROVIDED TO FAMILY PRIOR TO THEIR LEAVING THE FACILITY. ELAYNE STATED
THAT HOSPICE TEAM WOULD ALSO PROVIDE A HARD SCRIPT AS WELL. FAMILY GIVEN A
GLUCOSE MONITOR. SIGNED DISCHARGE PAPER WORK. VERBALIZED UNDERSTANDING OF THE
DISCHARGE PLAN AND THAT THE HOSPICE TEAM WOULD BE AT THEIR HOME AT 12PM TODAY.

## 2019-01-03 NOTE — NUR
COMFORT: PATIENT HAS GOOD EFFECT FROM ATIVAN FOR NAUSEA AND ANXIETY. ASSISTED
BACK TO BED AT THIS TIME.

## 2019-01-03 NOTE — NUR
COMFORT: PATIENT IS RESTING IN BED, GETS UP INDEPENDANTLY TO BATHROOM. NO
COMPLIANTS OF PAIN. PATIENT IS ASKING WHEN SHE WILL BE GOING HOME, DOES NOT
THINK DAUGHTER WILL COME TO PICK HER UP IN AM.

## 2019-01-03 NOTE — NUR
COMFORT: PATIENT IS UP IN CHAIR WATCHING TV, NO COMPLINATS OF PAIN.
 
COMFORT: PATIENT IS SLEEPING IN BED WITH NO COMPLIANTS. CALL BELL WITH IN
REACH.

## 2019-01-03 NOTE — NUR
COMFORT: PATIENT IS REPORTING NAUSEA, CRACKERS AND TEA ARE GIVEN, PER PATIENT
REQUEST, WITH POOR EFFECT. PATIENT IS ANXIOUS AND RESISTANT TO MEDICATION AT
FIRST BUT DOES AGREE TO PO ATIVAN.